# Patient Record
Sex: FEMALE | Race: WHITE | NOT HISPANIC OR LATINO | Employment: UNEMPLOYED | URBAN - NONMETROPOLITAN AREA
[De-identification: names, ages, dates, MRNs, and addresses within clinical notes are randomized per-mention and may not be internally consistent; named-entity substitution may affect disease eponyms.]

---

## 2020-04-10 ENCOUNTER — APPOINTMENT (OUTPATIENT)
Dept: ULTRASOUND IMAGING | Facility: HOSPITAL | Age: 25
End: 2020-04-10

## 2020-04-10 ENCOUNTER — HOSPITAL ENCOUNTER (OUTPATIENT)
Facility: HOSPITAL | Age: 25
Discharge: HOME | End: 2020-04-11
Attending: EMERGENCY MEDICINE | Admitting: OBSTETRICS & GYNECOLOGY

## 2020-04-10 DIAGNOSIS — O03.4 INCOMPLETE ABORTION: ICD-10-CM

## 2020-04-10 DIAGNOSIS — O03.9 MISCARRIAGE: Primary | ICD-10-CM

## 2020-04-10 LAB
ABO GROUP BLD: NORMAL
ALBUMIN SERPL-MCNC: 4.3 G/DL (ref 3.5–5.2)
ALBUMIN/GLOB SERPL: 1.5 G/DL
ALP SERPL-CCNC: 63 U/L (ref 39–117)
ALT SERPL W P-5'-P-CCNC: 16 U/L (ref 1–33)
ANION GAP SERPL CALCULATED.3IONS-SCNC: 16 MMOL/L (ref 5–15)
AST SERPL-CCNC: 12 U/L (ref 1–32)
BACTERIA UR QL AUTO: ABNORMAL /HPF
BASOPHILS # BLD AUTO: 0.04 10*3/MM3 (ref 0–0.2)
BASOPHILS NFR BLD AUTO: 0.3 % (ref 0–1.5)
BILIRUB SERPL-MCNC: <0.2 MG/DL (ref 0.2–1.2)
BILIRUB UR QL STRIP: NEGATIVE
BLD GP AB SCN SERPL QL: NEGATIVE
BUN BLD-MCNC: 10 MG/DL (ref 6–20)
BUN/CREAT SERPL: 20.8 (ref 7–25)
CALCIUM SPEC-SCNC: 8.7 MG/DL (ref 8.6–10.5)
CHLORIDE SERPL-SCNC: 101 MMOL/L (ref 98–107)
CLARITY UR: ABNORMAL
CLUE CELLS SPEC QL WET PREP: ABNORMAL
CO2 SERPL-SCNC: 22 MMOL/L (ref 22–29)
COLOR UR: ABNORMAL
CREAT BLD-MCNC: 0.48 MG/DL (ref 0.57–1)
DEPRECATED RDW RBC AUTO: 37.3 FL (ref 37–54)
EOSINOPHIL # BLD AUTO: 0.21 10*3/MM3 (ref 0–0.4)
EOSINOPHIL NFR BLD AUTO: 1.8 % (ref 0.3–6.2)
ERYTHROCYTE [DISTWIDTH] IN BLOOD BY AUTOMATED COUNT: 11.9 % (ref 12.3–15.4)
GFR SERPL CREATININE-BSD FRML MDRD: >150 ML/MIN/1.73
GFR SERPL CREATININE-BSD FRML MDRD: >150 ML/MIN/1.73
GLOBULIN UR ELPH-MCNC: 2.8 GM/DL
GLUCOSE BLD-MCNC: 130 MG/DL (ref 65–99)
GLUCOSE UR STRIP-MCNC: NEGATIVE MG/DL
HCG INTACT+B SERPL-ACNC: 6945 MIU/ML
HCT VFR BLD AUTO: 31 % (ref 34–46.6)
HGB BLD-MCNC: 10.6 G/DL (ref 12–15.9)
HGB UR QL STRIP.AUTO: ABNORMAL
HYALINE CASTS UR QL AUTO: ABNORMAL /LPF
HYDATID CYST SPEC WET PREP: ABNORMAL
IMM GRANULOCYTES # BLD AUTO: 0.05 10*3/MM3 (ref 0–0.05)
IMM GRANULOCYTES NFR BLD AUTO: 0.4 % (ref 0–0.5)
KETONES UR QL STRIP: NEGATIVE
LEUKOCYTE ESTERASE UR QL STRIP.AUTO: ABNORMAL
LYMPHOCYTES # BLD AUTO: 2.36 10*3/MM3 (ref 0.7–3.1)
LYMPHOCYTES NFR BLD AUTO: 19.8 % (ref 19.6–45.3)
MCH RBC QN AUTO: 29.4 PG (ref 26.6–33)
MCHC RBC AUTO-ENTMCNC: 34.2 G/DL (ref 31.5–35.7)
MCV RBC AUTO: 85.9 FL (ref 79–97)
MONOCYTES # BLD AUTO: 0.75 10*3/MM3 (ref 0.1–0.9)
MONOCYTES NFR BLD AUTO: 6.3 % (ref 5–12)
NEUTROPHILS # BLD AUTO: 8.53 10*3/MM3 (ref 1.7–7)
NEUTROPHILS NFR BLD AUTO: 71.4 % (ref 42.7–76)
NITRITE UR QL STRIP: NEGATIVE
NRBC BLD AUTO-RTO: 0 /100 WBC (ref 0–0.2)
NUMBER OF DOSES: NORMAL
PH UR STRIP.AUTO: <=5 [PH] (ref 5–8)
PLATELET # BLD AUTO: 361 10*3/MM3 (ref 140–450)
PMV BLD AUTO: 9.2 FL (ref 6–12)
POTASSIUM BLD-SCNC: 3.8 MMOL/L (ref 3.5–5.2)
PROT SERPL-MCNC: 7.1 G/DL (ref 6–8.5)
PROT UR QL STRIP: ABNORMAL
RBC # BLD AUTO: 3.61 10*6/MM3 (ref 3.77–5.28)
RBC # UR: ABNORMAL /HPF
REF LAB TEST METHOD: ABNORMAL
RH BLD: POSITIVE
SODIUM BLD-SCNC: 139 MMOL/L (ref 136–145)
SP GR UR STRIP: 1.01 (ref 1–1.03)
SQUAMOUS #/AREA URNS HPF: ABNORMAL /HPF
T VAGINALIS SPEC QL WET PREP: ABNORMAL
UROBILINOGEN UR QL STRIP: ABNORMAL
WBC NRBC COR # BLD: 11.94 10*3/MM3 (ref 3.4–10.8)
WBC SPEC QL WET PREP: ABNORMAL
WBC UR QL AUTO: ABNORMAL /HPF
YEAST GENITAL QL WET PREP: ABNORMAL

## 2020-04-10 PROCEDURE — 76801 OB US < 14 WKS SINGLE FETUS: CPT

## 2020-04-10 PROCEDURE — 86850 RBC ANTIBODY SCREEN: CPT | Performed by: PHYSICIAN ASSISTANT

## 2020-04-10 PROCEDURE — 80053 COMPREHEN METABOLIC PANEL: CPT | Performed by: PHYSICIAN ASSISTANT

## 2020-04-10 PROCEDURE — 86901 BLOOD TYPING SEROLOGIC RH(D): CPT | Performed by: PHYSICIAN ASSISTANT

## 2020-04-10 PROCEDURE — 76817 TRANSVAGINAL US OBSTETRIC: CPT

## 2020-04-10 PROCEDURE — 84702 CHORIONIC GONADOTROPIN TEST: CPT | Performed by: PHYSICIAN ASSISTANT

## 2020-04-10 PROCEDURE — 99284 EMERGENCY DEPT VISIT MOD MDM: CPT

## 2020-04-10 PROCEDURE — 87086 URINE CULTURE/COLONY COUNT: CPT | Performed by: PHYSICIAN ASSISTANT

## 2020-04-10 PROCEDURE — 81001 URINALYSIS AUTO W/SCOPE: CPT | Performed by: PHYSICIAN ASSISTANT

## 2020-04-10 PROCEDURE — 87210 SMEAR WET MOUNT SALINE/INK: CPT | Performed by: PHYSICIAN ASSISTANT

## 2020-04-10 PROCEDURE — 85025 COMPLETE CBC W/AUTO DIFF WBC: CPT | Performed by: PHYSICIAN ASSISTANT

## 2020-04-10 PROCEDURE — 86900 BLOOD TYPING SEROLOGIC ABO: CPT | Performed by: PHYSICIAN ASSISTANT

## 2020-04-10 RX ORDER — SODIUM CHLORIDE 0.9 % (FLUSH) 0.9 %
10 SYRINGE (ML) INJECTION AS NEEDED
Status: DISCONTINUED | OUTPATIENT
Start: 2020-04-10 | End: 2020-04-11 | Stop reason: HOSPADM

## 2020-04-10 RX ADMIN — SODIUM CHLORIDE 1000 ML: 9 INJECTION, SOLUTION INTRAVENOUS at 21:06

## 2020-04-11 ENCOUNTER — ANESTHESIA (OUTPATIENT)
Dept: PERIOP | Facility: HOSPITAL | Age: 25
End: 2020-04-11

## 2020-04-11 ENCOUNTER — ANESTHESIA EVENT (OUTPATIENT)
Dept: PERIOP | Facility: HOSPITAL | Age: 25
End: 2020-04-11

## 2020-04-11 VITALS
TEMPERATURE: 98.5 F | DIASTOLIC BLOOD PRESSURE: 71 MMHG | HEART RATE: 111 BPM | HEIGHT: 64 IN | OXYGEN SATURATION: 100 % | WEIGHT: 135 LBS | RESPIRATION RATE: 16 BRPM | SYSTOLIC BLOOD PRESSURE: 118 MMHG | BODY MASS INDEX: 23.05 KG/M2

## 2020-04-11 PROBLEM — O03.9 MISCARRIAGE: Status: ACTIVE | Noted: 2020-04-11

## 2020-04-11 PROBLEM — O03.4 INCOMPLETE ABORTION: Status: ACTIVE | Noted: 2020-04-11

## 2020-04-11 LAB
BACTERIA SPEC AEROBE CULT: NORMAL
HCT VFR BLD AUTO: 26.2 % (ref 34–46.6)
HGB BLD-MCNC: 8.9 G/DL (ref 12–15.9)

## 2020-04-11 PROCEDURE — 25010000002 PROPOFOL 10 MG/ML EMULSION: Performed by: NURSE ANESTHETIST, CERTIFIED REGISTERED

## 2020-04-11 PROCEDURE — 88305 TISSUE EXAM BY PATHOLOGIST: CPT | Performed by: OBSTETRICS & GYNECOLOGY

## 2020-04-11 PROCEDURE — G0378 HOSPITAL OBSERVATION PER HR: HCPCS

## 2020-04-11 PROCEDURE — 25010000002 CEFTRIAXONE PER 250 MG: Performed by: EMERGENCY MEDICINE

## 2020-04-11 PROCEDURE — 85014 HEMATOCRIT: CPT | Performed by: EMERGENCY MEDICINE

## 2020-04-11 PROCEDURE — 25010000002 FENTANYL CITRATE (PF) 250 MCG/5ML SOLUTION: Performed by: NURSE ANESTHETIST, CERTIFIED REGISTERED

## 2020-04-11 PROCEDURE — 59812 TREATMENT OF MISCARRIAGE: CPT | Performed by: OBSTETRICS & GYNECOLOGY

## 2020-04-11 PROCEDURE — 85018 HEMOGLOBIN: CPT | Performed by: EMERGENCY MEDICINE

## 2020-04-11 PROCEDURE — 96365 THER/PROPH/DIAG IV INF INIT: CPT

## 2020-04-11 RX ORDER — SODIUM CHLORIDE 0.9 % (FLUSH) 0.9 %
10 SYRINGE (ML) INJECTION AS NEEDED
Status: CANCELLED | OUTPATIENT
Start: 2020-04-11

## 2020-04-11 RX ORDER — SODIUM CHLORIDE, SODIUM LACTATE, POTASSIUM CHLORIDE, CALCIUM CHLORIDE 600; 310; 30; 20 MG/100ML; MG/100ML; MG/100ML; MG/100ML
100 INJECTION, SOLUTION INTRAVENOUS CONTINUOUS
Status: CANCELLED | OUTPATIENT
Start: 2020-04-11

## 2020-04-11 RX ORDER — LABETALOL HYDROCHLORIDE 5 MG/ML
5 INJECTION, SOLUTION INTRAVENOUS
Status: DISCONTINUED | OUTPATIENT
Start: 2020-04-11 | End: 2020-04-11 | Stop reason: HOSPADM

## 2020-04-11 RX ORDER — SODIUM CHLORIDE 0.9 % (FLUSH) 0.9 %
3-10 SYRINGE (ML) INJECTION AS NEEDED
Status: CANCELLED | OUTPATIENT
Start: 2020-04-11

## 2020-04-11 RX ORDER — IBUPROFEN 600 MG/1
600 TABLET ORAL EVERY 6 HOURS PRN
Status: CANCELLED | OUTPATIENT
Start: 2020-04-11

## 2020-04-11 RX ORDER — FENTANYL CITRATE 50 UG/ML
INJECTION, SOLUTION INTRAMUSCULAR; INTRAVENOUS AS NEEDED
Status: DISCONTINUED | OUTPATIENT
Start: 2020-04-11 | End: 2020-04-11 | Stop reason: SURG

## 2020-04-11 RX ORDER — FLUMAZENIL 0.1 MG/ML
0.2 INJECTION INTRAVENOUS AS NEEDED
Status: DISCONTINUED | OUTPATIENT
Start: 2020-04-11 | End: 2020-04-11 | Stop reason: HOSPADM

## 2020-04-11 RX ORDER — LIDOCAINE HYDROCHLORIDE 10 MG/ML
0.5 INJECTION, SOLUTION EPIDURAL; INFILTRATION; INTRACAUDAL; PERINEURAL ONCE AS NEEDED
Status: CANCELLED | OUTPATIENT
Start: 2020-04-11

## 2020-04-11 RX ORDER — IBUPROFEN 600 MG/1
600 TABLET ORAL ONCE AS NEEDED
Status: DISCONTINUED | OUTPATIENT
Start: 2020-04-11 | End: 2020-04-11 | Stop reason: HOSPADM

## 2020-04-11 RX ORDER — NALOXONE HCL 0.4 MG/ML
0.4 VIAL (ML) INJECTION AS NEEDED
Status: DISCONTINUED | OUTPATIENT
Start: 2020-04-11 | End: 2020-04-11 | Stop reason: HOSPADM

## 2020-04-11 RX ORDER — OXYCODONE AND ACETAMINOPHEN 10; 325 MG/1; MG/1
1 TABLET ORAL ONCE AS NEEDED
Status: DISCONTINUED | OUTPATIENT
Start: 2020-04-11 | End: 2020-04-11 | Stop reason: HOSPADM

## 2020-04-11 RX ORDER — FENTANYL CITRATE 50 UG/ML
25 INJECTION, SOLUTION INTRAMUSCULAR; INTRAVENOUS
Status: DISCONTINUED | OUTPATIENT
Start: 2020-04-11 | End: 2020-04-11 | Stop reason: HOSPADM

## 2020-04-11 RX ORDER — SODIUM CHLORIDE 0.9 % (FLUSH) 0.9 %
3 SYRINGE (ML) INJECTION EVERY 12 HOURS SCHEDULED
Status: CANCELLED | OUTPATIENT
Start: 2020-04-11

## 2020-04-11 RX ORDER — OXYCODONE HYDROCHLORIDE AND ACETAMINOPHEN 5; 325 MG/1; MG/1
1-2 TABLET ORAL EVERY 4 HOURS PRN
Qty: 6 TABLET | Refills: 0 | Status: SHIPPED | OUTPATIENT
Start: 2020-04-11

## 2020-04-11 RX ORDER — KETAMINE HYDROCHLORIDE 50 MG/ML
INJECTION, SOLUTION, CONCENTRATE INTRAMUSCULAR; INTRAVENOUS AS NEEDED
Status: DISCONTINUED | OUTPATIENT
Start: 2020-04-11 | End: 2020-04-11 | Stop reason: SURG

## 2020-04-11 RX ORDER — PROMETHAZINE HYDROCHLORIDE 25 MG/1
12.5 TABLET ORAL ONCE AS NEEDED
Status: DISCONTINUED | OUTPATIENT
Start: 2020-04-11 | End: 2020-04-11 | Stop reason: HOSPADM

## 2020-04-11 RX ORDER — OXYCODONE AND ACETAMINOPHEN 7.5; 325 MG/1; MG/1
2 TABLET ORAL EVERY 4 HOURS PRN
Status: DISCONTINUED | OUTPATIENT
Start: 2020-04-11 | End: 2020-04-11 | Stop reason: HOSPADM

## 2020-04-11 RX ORDER — SODIUM CHLORIDE, SODIUM LACTATE, POTASSIUM CHLORIDE, CALCIUM CHLORIDE 600; 310; 30; 20 MG/100ML; MG/100ML; MG/100ML; MG/100ML
INJECTION, SOLUTION INTRAVENOUS CONTINUOUS PRN
Status: DISCONTINUED | OUTPATIENT
Start: 2020-04-11 | End: 2020-04-11 | Stop reason: SURG

## 2020-04-11 RX ORDER — MIDAZOLAM HYDROCHLORIDE 1 MG/ML
1 INJECTION INTRAMUSCULAR; INTRAVENOUS
Status: CANCELLED | OUTPATIENT
Start: 2020-04-11

## 2020-04-11 RX ORDER — MIDAZOLAM HYDROCHLORIDE 1 MG/ML
2 INJECTION INTRAMUSCULAR; INTRAVENOUS
Status: CANCELLED | OUTPATIENT
Start: 2020-04-11

## 2020-04-11 RX ORDER — ONDANSETRON 2 MG/ML
4 INJECTION INTRAMUSCULAR; INTRAVENOUS ONCE AS NEEDED
Status: DISCONTINUED | OUTPATIENT
Start: 2020-04-11 | End: 2020-04-11 | Stop reason: HOSPADM

## 2020-04-11 RX ORDER — ACETAMINOPHEN 500 MG
1000 TABLET ORAL ONCE
Status: CANCELLED | OUTPATIENT
Start: 2020-04-11 | End: 2020-04-11

## 2020-04-11 RX ORDER — OXYCODONE HYDROCHLORIDE AND ACETAMINOPHEN 5; 325 MG/1; MG/1
1 TABLET ORAL ONCE AS NEEDED
Status: CANCELLED | OUTPATIENT
Start: 2020-04-11 | End: 2020-04-21

## 2020-04-11 RX ADMIN — KETAMINE HYDROCHLORIDE 100 MG: 50 INJECTION, SOLUTION INTRAMUSCULAR; INTRAVENOUS at 04:56

## 2020-04-11 RX ADMIN — SODIUM CHLORIDE, POTASSIUM CHLORIDE, SODIUM LACTATE AND CALCIUM CHLORIDE: 600; 310; 30; 20 INJECTION, SOLUTION INTRAVENOUS at 04:47

## 2020-04-11 RX ADMIN — PROPOFOL 125 MCG/KG/MIN: 10 INJECTION, EMULSION INTRAVENOUS at 04:56

## 2020-04-11 RX ADMIN — FENTANYL CITRATE 100 MCG: 50 INJECTION INTRAMUSCULAR; INTRAVENOUS at 04:56

## 2020-04-11 RX ADMIN — SODIUM CHLORIDE 1000 ML: 9 INJECTION, SOLUTION INTRAVENOUS at 03:47

## 2020-04-11 RX ADMIN — CEFTRIAXONE SODIUM 2 G: 2 INJECTION, POWDER, FOR SOLUTION INTRAMUSCULAR; INTRAVENOUS at 00:14

## 2020-04-11 RX ADMIN — DOXYCYCLINE 100 MG: 100 INJECTION, POWDER, LYOPHILIZED, FOR SOLUTION INTRAVENOUS at 05:10

## 2020-04-11 NOTE — ED PROVIDER NOTES
"Subjective   History of Present Illness    Patient is a 24-year-old female presenting to ED with vaginal bleeding and pregnancy.  Patient is G5, P4, A1 (miscarriage) with one set of twins.  Patient reported her LNMP was on 1/26/2020 making her approximately 10-1/2 weeks pregnant at this time.  Patient reported 10 days ago she developed very light vaginal spotting which turned into light vaginal bleeding.  Patient reported this afternoon she started having intense lower abdominal cramping at which time she began \"gushing blood and passing lots and lots of clots totaling a quart of blood.\"  Patient reported she started to feel very dizzy and lightheaded and felt like her color was not normal at which time her family contacted a  to bring her to the ED as she is part of the [facility] community.  Patient reported upon arrival to ED her abdomen pain is improving and she denies any nausea, diaphoresis, or any other symptoms.  Patient stated she wanted to make sure that all of the miscarriage had passed at which time she decided to present to the ED. Patient denies any recent illnesses, fevers, hematuria, dysuria, or flank pain. Patient denies any medication use prior to arrival.     Patient denies use of any tobacco products including cigarettes.  Patient denies use of alcohol, marijuana, or any other IV/recreational/illicit drugs.  Patient reported she takes daily vitamins with no other medications.  Patient denies any previous surgical history.  Patient denies any known medication allergies.    Records reviewed show patient has no previous ED/outptient visits or imaging.     Review of Systems   Constitutional: Negative for chills, diaphoresis and fever.   HENT: Negative.    Eyes: Negative.    Respiratory: Negative.  Negative for cough and shortness of breath.    Cardiovascular: Negative.  Negative for chest pain and palpitations.   Gastrointestinal: Positive for abdominal pain (lower abdominal cramping). Negative for " nausea and vomiting.   Genitourinary: Positive for vaginal bleeding (spotting for 10 days; passing clots/heavy today). Negative for dysuria, flank pain, hematuria and vaginal discharge.        Reports + pregnancy (approx 10 weeks tomorrow)   Musculoskeletal: Negative.  Negative for myalgias.   Skin: Positive for pallor. Negative for rash and wound.   Neurological: Positive for dizziness (improving), weakness (generalized) and light-headedness. Negative for syncope.   Psychiatric/Behavioral: Negative.  Negative for confusion.   All other systems reviewed and are negative.      History reviewed. No pertinent past medical history.    No Known Allergies    History reviewed. No pertinent surgical history.    History reviewed. No pertinent family history.    Social History     Socioeconomic History   • Marital status:      Spouse name: Not on file   • Number of children: Not on file   • Years of education: Not on file   • Highest education level: Not on file   Tobacco Use   • Smoking status: Never Smoker   Substance and Sexual Activity   • Alcohol use: Never     Frequency: Never   • Drug use: Defer   • Sexual activity: Defer           Objective   Physical Exam   Constitutional: She is oriented to person, place, and time. She appears well-developed and well-nourished. She is cooperative. No distress.   HENT:   Head: Normocephalic and atraumatic.   Right Ear: External ear normal.   Left Ear: External ear normal.   Mouth/Throat: Uvula is midline, oropharynx is clear and moist and mucous membranes are normal.   Eyes: Pupils are equal, round, and reactive to light. Conjunctivae, EOM and lids are normal. Right eye exhibits no discharge. Left eye exhibits no discharge. Right conjunctiva is not injected. Left conjunctiva is not injected. No scleral icterus.   Neck: Normal range of motion. Neck supple.   Cardiovascular: Regular rhythm, normal heart sounds, intact distal pulses and normal pulses. Tachycardia present.   No  murmur heard.  Pulses:       Radial pulses are 2+ on the right side, and 2+ on the left side.        Posterior tibial pulses are 2+ on the right side, and 2+ on the left side.   Pulmonary/Chest: Effort normal and breath sounds normal. No respiratory distress. She has no decreased breath sounds. She has no wheezes. She has no rhonchi. She has no rales. She exhibits no bony tenderness.   Abdominal: Soft. Normal appearance and bowel sounds are normal. There is tenderness (mild) in the suprapubic area. There is no guarding and no CVA tenderness.   Genitourinary: Pelvic exam was performed with patient supine. There is bleeding in the vagina.   Genitourinary Comments: Chaperone present for entire examination, Tonya RN  1 large baseball sized clot as well as 1 tangerine sized clot which appeared to be tissue material nature passed just prior to performing pelvic examination. Cervical OS open.      Musculoskeletal:        Cervical back: Normal.        Thoracic back: Normal.        Lumbar back: Normal.   Lymphadenopathy:     She has no cervical adenopathy.   Neurological: She is alert and oriented to person, place, and time. She has normal strength.   Skin: Skin is warm, dry and intact. Capillary refill takes less than 2 seconds. No rash noted. She is not diaphoretic. There is pallor.   Psychiatric: She has a normal mood and affect. Her speech is normal and behavior is normal. Thought content normal.   Nursing note and vitals reviewed.      Procedures           ED Course  ED Course as of Apr 11 0340   Fri Apr 10, 2020   2138 Impression discussion with .  Updated  on plan and pending results at this time.   with no further questions, concerns, or needs at this time.    [JS]   2140 Urinalysis with TNTC RBC, 6-12 white blood cells, no bacteria, 0-12 squamous epithelium, small leukocytes, nitrate negative.  CMP with evidence of dehydration, anion gap of 16, decreased creatinine to 0.48.  Leukocytosis of  11.94,  hemoglobin and hematocrit 10.6/31.  Remainder of labs pending at this time.Ultrasound pending transportation to department.    [JS]   2151 Blood type A POSITIVE.  HCG quant level 6945    [JS]   2213 Patient just returning from US at this time.  Patient still tachycardic at 123. Patient reporting she had no increased pain after ultrasound.  Reviewed with patient urinalysis findings, hCG quant level, and continued need for pelvic examination.  Patient is amenable to continued treatment plan at this time with no further questions, concerns, or needs.    [JS]   2230 Pelvic examination performed at bedside at this time.  Chaperone present for entire examination, Jennifer CALVILLO.  Just prior to examination patient felt the urge to urinate at which time she passed a very large baseball size clot as well as a smaller tangerine sized clot.  These clots appear to be tissue material in nature possibly placenta.  After this pelvic examination was performed and a few very small clots were removed.  Also appears to be open at this time.  Patient had tenderness upon examination but no other abnormalities noted.  Patient tachycardic at 141 after examination.  Patient very adamantly continues to deny need for anti-emetic or pain medications.  Patient advised that ultrasound results as well as wet prep are pending still at this time.  Patient was no further questions, concerns, or needs.    [JS]   2236 Discussed case with Dr. Jairon Fortune.  Reviewed patient's history as well as preliminary ultrasound report.  Discussed clots passed during pelvic examination.  Dr. Fortune in agreement at this time for OB consultation.    [JS]   2241 Phone discussion with Dr. Levy, OB/GYN.  Advises at this time that patient's OS appear to be dilated due to her history of multi-gestations.  Recommends repeating ultrasound and contacting her back with results. Dr. Levy made aware that Dr. Fortune will be assuming care of patient at this  time.     [JS]   2251 Upon reevaluation at this time patient resting in bed slightly nervous still.  Discussed with patient results of initial ultrasound as well as discussion with OB/GYN provider.  Discussed with patient need to repeat ultrasound at this time to ensure all products of conception have been passed.  Patient is very appreciative of help and amenable to continued treatment plan at this time.  Patient has been in made aware that my shift is ending and there will be a change in provider to Dr. Fortune.  Patient with no further questions, concerns, or needs at this time and continues to deny any needs for any type of medication intervention.    [JS]   2255 Dr. Jairon Fortune will be assuming care of patient at this time.  Dr. Whitley made aware that pending the results of repeat ultrasound as well as repeat H&H patient will be reevaluated with a call placed to Dr. Levy for further disposition decision.  Please see Dr. Whitley's note below for further ED course as well as final diagnosis.Working diagnosis at this time is: Miscarriage.    [JS]   Sat Apr 11, 2020   0224 HR is still 120s, she has dropped her hgb. Given this I did speak with Dr. Levy, will come to see for possible D and C    [JH]   0340 DR. Levy will take for D and C    [JH]      ED Course User Index  [JH] Jairon Fortune MD  [JS] Jaiden Louise PA-C EMR Dragon/transcription disclaimer: Much of this encounter note was created utilizing an electronic transcription/translation of spoken language to printed text.  Electronic translation of spoken language may permit erroneous, or at times, nonsensical words or phrases to be in advertently transcribed; although I have reviewed the note for such errors to the best of my ability, some may still exist.            MDM  Number of Diagnoses or Management Options     Amount and/or Complexity of Data Reviewed  Clinical lab tests: ordered and  reviewed  Tests in the radiology section of CPT®: ordered  Decide to obtain previous medical records or to obtain history from someone other than the patient: yes  Review and summarize past medical records: yes  Discuss the patient with other providers: yes        Final diagnoses:   Miscarriage            Jairon Fortune MD  04/11/20 0349

## 2020-04-11 NOTE — ANESTHESIA POSTPROCEDURE EVALUATION
"Patient: Jessica Bang    Procedure Summary     Date:  04/11/20 Room / Location:   [facility] OR 06 /  [facility]    Anesthesia Start:  0453 Anesthesia Stop:  0528    Procedure:  DILATATION AND CURETTAGE WITH SUCTION (N/A Vagina) Diagnosis:       Miscarriage      (Miscarriage [O03.9])    Surgeon:  Julissa Levy DO Provider:  Arnulfo Beasley CRNA    Anesthesia Type:  general ASA Status:  1 - Emergent          Anesthesia Type: general    Vitals  No vitals data found for the desired time range.          Post Anesthesia Care and Evaluation    Patient location during evaluation: PACU  Patient participation: complete - patient participated  Level of consciousness: awake and alert  Pain management: adequate  Airway patency: patent  Anesthetic complications: No anesthetic complications    Cardiovascular status: acceptable  Respiratory status: acceptable  Hydration status: acceptable    Comments: Blood pressure 115/68, pulse 96, temperature 98.5 °F (36.9 °C), temperature source Oral, resp. rate 16, height 162.6 cm (64\"), weight 61.2 kg (135 lb), last menstrual period 01/26/2020, SpO2 100 %.    Pt discharged from PACU based on stephen score >8      "

## 2020-04-11 NOTE — OP NOTE
[facility]  Jessica Bang  : 1995  MRN: [id number]  [facility]: [id number]  Date: 2020    Operative Note    DILATATION AND CURETTAGE WITH SUCTION      Pre-op Diagnosis:  Miscarriage [O03.9]   Incomplete    Post-op Diagnosis:  Post-Op Diagnosis Codes:     * Miscarriage [O03.9]   Procedure: Procedure(s):  DILATATION AND CURETTAGE WITH SUCTION   Surgeon: Surgeon(s):  Julissa Levy DO       Anesthesia: General by ERIC Beasley CRNA     Estimated Blood Loss: minimal   mls   Fluids: 1100   mls   UOP: 50   mls   Drains: none   ABx: Doxycycline      Specimens:  Products of conception   Findings: 7 to 8 week size uterus, cervix noted to be dilated 1 cm   Complications: None      INDICATION: Jessica Bang is a 24 y.o. female  Who presented to the emergency room with complaints of heavy vaginal bleeding. On examination patient was noted to be dilated 1 cm as well as her hemoglobin dropped wihile being evaluated in the emergency department. Risk, benefits to suction dilatation and curettage were discussed with patient.   PROCEDURE IN DETAIL: After informed consent was obtained, the patient was taken to the operating room, where general anesthesia was administered. She was placed in the lithotomy position in [facility], and her abdomen perineum and vagina were prepped and draped in normal sterile fashion. A weighted speculum was placed in the vagina and her bladder drained with a straight metal catheter. The anterior lip of the cervix was visualized and grasped with a single tooth tenaculum forcep. The cervix was dilated with Scott dilators to accept a 8 mm curved suction curet.  The curet was gently placed in the uterus at the fundus and the tubing attached.  The suction device was activated and suction allowed to build.  The suction curette was then rotated and slowly withdrawn with tissue noted through the tubing.  The suction was released and the curette reintroduced to the uterine fundus.  The curet  was then rotated and withdrawn again noting further removal of tissue.  A third pass in a similar fashion revealed no further tissue.  A medium size sharp curette was then placed in the uterus and a gritty texture was noted in all 4 quadrants.  The sharp curette was removed and the suction curette passed one more time to remove any clots or debris from the uterine cavity.  The cervix was then observed and no active bleeding was noted.  The single tooth tenaculum was removed and the cervix was hemostatic.  All instruments were then removed from the vagina.   The patient was then awakened and taken to the recovery room in stable condition.  She tolerated the procedure well and without complications.  All sponge needle and instrument counts were correct times 3 per the OR staff.    Julissa Levy DO   4/11/2020  05:31

## 2020-04-11 NOTE — H&P
"[facility]  Jessica Bang  : 1995  MRN: [id number]  CSN: [id number]    History and Physical    Subjective   Jessica Bang is a 24 y.o. year old  LMP 2020 presents with complaints of vaginal bleeding. Patient reports that she has had bleeding for the last 10 days. She reports that earlier today it became heavier and she started to feel lightheaded and dizzy. She reports that she had not yet had an ultrasound to confirm dating but had been seen by a midwife approximately two weeks ago.     History reviewed. No pertinent past medical history.  History reviewed. No pertinent surgical history.  Social History    Tobacco Use      Smoking status: Never Smoker      Current Facility-Administered Medications:   •  sodium chloride 0.9 % bolus 1,000 mL, 1,000 mL, Intravenous, Once, Jairon Fortune MD  •  [COMPLETED] Insert peripheral IV, , , Once **AND** sodium chloride 0.9 % flush 10 mL, 10 mL, Intravenous, PRN, Jaiden Louise PA-C  No current outpatient medications on file.    No Known Allergies    History reviewed. No pertinent family history.  Review of Systems   Genitourinary: Positive for pelvic pain and vaginal bleeding.         Objective   /81   Pulse 112   Temp 98.5 °F (36.9 °C) (Oral)   Resp 16   Ht 162.6 cm (64\")   Wt 61.2 kg (135 lb)   LMP 2020 (Approximate)   SpO2 97%   BMI 23.17 kg/m²     Physical Exam   Physical Exam   Constitutional: She appears well-developed and well-nourished.   HENT:   Head: Normocephalic and atraumatic.   Eyes: Conjunctivae are normal. Right eye exhibits no discharge. Left eye exhibits no discharge.   Neck: Normal range of motion.   Pulmonary/Chest: Effort normal.   Abdominal: Soft. She exhibits no distension and no mass. There is no tenderness. There is no rebound and no guarding.   Genitourinary: Vagina normal. Pelvic exam was performed with patient supine. There is no rash or tenderness on the right labia. There is no rash or tenderness on " the left labia.   Genitourinary Comments: Scant blood noted, large clots noted in bed pan that patient passed previously, cervix noted to be 1 cm dilated   Musculoskeletal: Normal range of motion.   Neurological: She is alert.   Skin: Skin is warm and dry.   Psychiatric: She has a normal mood and affect. Her behavior is normal. Judgment normal.   Nursing note and vitals reviewed.      Labs  Lab Results   Component Value Date     04/10/2020    HGB 8.9 (L) 2020    HCT 26.2 (L) 2020    WBC 11.94 (H) 04/10/2020     04/10/2020    K 3.8 04/10/2020     04/10/2020    CO2 22.0 04/10/2020    BUN 10 04/10/2020    CREATININE 0.48 (L) 04/10/2020    GLUCOSE 130 (H) 04/10/2020    ALBUMIN 4.30 04/10/2020    CALCIUM 8.7 04/10/2020    AST 12 04/10/2020    ALT 16 04/10/2020    BILITOT <0.2 (L) 04/10/2020     TVUS today revealed irregular appearing gestational sac with no fetal pole or yolk sac     Assessment & Plan    Incomplete    1. Discussed recommendations of dilatation and curettage with suction at this time due to incomplete . Discussed with patient that since she has already had significant bleeding in the ER and her cervix is noted to be dilated would recommend surgery at this time. Risk, benefits discussed with patient and support person. Discussed risk including bleeding and infection as well as damage to bowel and bladder   2. Patient agreeable to suction d and c at this time. Will admit to OR.     Julissa Levy DO  2020  03:33

## 2020-04-11 NOTE — ANESTHESIA PREPROCEDURE EVALUATION
Anesthesia Evaluation     Patient summary reviewed and Nursing notes reviewed   NPO Solid Status: > 8 hours  NPO Liquid Status: > 4 hours           Airway   Mallampati: I  No difficulty expected  Dental - normal exam     Pulmonary - negative pulmonary ROS   Cardiovascular - negative cardio ROS  Exercise tolerance: excellent (>7 METS)        Neuro/Psych- negative ROS  GI/Hepatic/Renal/Endo - negative ROS     Musculoskeletal (-) negative ROS    Abdominal    Substance History - negative use     OB/GYN    (+) Pregnant,         Other                        Anesthesia Plan    ASA 1 - emergent     general     intravenous induction     Anesthetic plan, all risks, benefits, and alternatives have been provided, discussed and informed consent has been obtained with: patient.

## 2020-04-14 ENCOUNTER — TELEPHONE (OUTPATIENT)
Dept: OBSTETRICS AND GYNECOLOGY | Facility: CLINIC | Age: 25
End: 2020-04-14

## 2020-04-14 LAB
CYTO UR: NORMAL
LAB AP CASE REPORT: NORMAL
PATH REPORT.FINAL DX SPEC: NORMAL
PATH REPORT.GROSS SPEC: NORMAL

## 2020-04-14 NOTE — TELEPHONE ENCOUNTER
Scheduled patient for an upcoming postop on 05/01/2020. Patient is Confucianism and states she doesn't have a phone number so appointment date and time was mailed certified on 04/14/2020.

## 2020-05-01 ENCOUNTER — OFFICE VISIT (OUTPATIENT)
Dept: OBSTETRICS AND GYNECOLOGY | Facility: CLINIC | Age: 25
End: 2020-05-01

## 2020-05-01 VITALS
HEIGHT: 64 IN | SYSTOLIC BLOOD PRESSURE: 120 MMHG | WEIGHT: 146 LBS | DIASTOLIC BLOOD PRESSURE: 68 MMHG | BODY MASS INDEX: 24.92 KG/M2

## 2020-05-01 DIAGNOSIS — Z98.890 POSTOPERATIVE STATE: Primary | ICD-10-CM

## 2020-05-01 PROCEDURE — 99024 POSTOP FOLLOW-UP VISIT: CPT | Performed by: OBSTETRICS & GYNECOLOGY

## 2020-05-01 NOTE — PROGRESS NOTES
"Subjective   Jessica Bang is a 24 y.o. female.     Chief Complaint   Patient presents with   • Post-op     Pt is here for a post op follow up Pt had a D&C on 4/10  PT is doing well after the procedure.  PT states she did have some headaches at first but is much better now.  Pt states she is not bleeding for the past week       24 year old female para 4 presents for postoperative evaluation from a suction dilatation and curettage for incomplete . Patient reports she is doing well at this time. Pain controlled. She is no longer bleeding at this time. She is urinating and ambulating without difficulty. NO other complaints at this time.        Review of Systems   Constitutional: Negative for chills and fever.   Genitourinary: Positive for vaginal bleeding. Negative for vaginal discharge.       Objective   /68   Ht 162.6 cm (64\")   Wt 66.2 kg (146 lb)   LMP 2020 (Approximate)   Breastfeeding No   BMI 25.06 kg/m²   Patient's last menstrual period was 2020 (approximate).  Physical Exam   Constitutional: She appears well-developed and well-nourished. No distress.   HENT:   Head: Normocephalic and atraumatic.   Pulmonary/Chest: Effort normal.   Abdominal: Soft. There is no tenderness.   Genitourinary: Vagina normal and cervix normal. Pelvic exam was performed with patient supine. There is no tenderness or lesion on the right labia. There is no tenderness or lesion on the left labia. Uterus is not enlarged or tender. Cervix does not exhibit motion tenderness, discharge or friability. Right adnexum displays no tenderness and no fullness. Left adnexum displays no tenderness and no fullness. No tenderness or bleeding in the vagina. No signs of injury around the vagina. No vaginal discharge found.   Nursing note and vitals reviewed.        Assessment/Plan   Problems Addressed this Visit     None      Visit Diagnoses     Postoperative state    -  Primary    Relevant Orders    TSH    CBC & " Differential      -Will check CBC at this time.   -Counseled patient at this time about postoperative restriction   -Will check TSH due to patient's history maternal history of thyroid dysfunction.   -Counseled patient about returning if she has another miscarriage for further work up at this time.   -All questions answered and patient verbalized understanding and had no further questions.   -Encourage prenatal vitamin at this time        Julissa Levy, DO

## 2020-05-02 LAB
BASOPHILS # BLD AUTO: 0.03 10*3/MM3 (ref 0–0.2)
BASOPHILS NFR BLD AUTO: 0.4 % (ref 0–1.5)
EOSINOPHIL # BLD AUTO: 0.27 10*3/MM3 (ref 0–0.4)
EOSINOPHIL NFR BLD AUTO: 3.9 % (ref 0.3–6.2)
ERYTHROCYTE [DISTWIDTH] IN BLOOD BY AUTOMATED COUNT: 12.9 % (ref 12.3–15.4)
HCT VFR BLD AUTO: 32.3 % (ref 34–46.6)
HGB BLD-MCNC: 10.3 G/DL (ref 12–15.9)
IMM GRANULOCYTES # BLD AUTO: 0.02 10*3/MM3 (ref 0–0.05)
IMM GRANULOCYTES NFR BLD AUTO: 0.3 % (ref 0–0.5)
LYMPHOCYTES # BLD AUTO: 2.52 10*3/MM3 (ref 0.7–3.1)
LYMPHOCYTES NFR BLD AUTO: 36.5 % (ref 19.6–45.3)
MCH RBC QN AUTO: 28.4 PG (ref 26.6–33)
MCHC RBC AUTO-ENTMCNC: 31.9 G/DL (ref 31.5–35.7)
MCV RBC AUTO: 89 FL (ref 79–97)
MONOCYTES # BLD AUTO: 0.34 10*3/MM3 (ref 0.1–0.9)
MONOCYTES NFR BLD AUTO: 4.9 % (ref 5–12)
NEUTROPHILS # BLD AUTO: 3.73 10*3/MM3 (ref 1.7–7)
NEUTROPHILS NFR BLD AUTO: 54 % (ref 42.7–76)
NRBC BLD AUTO-RTO: 0 /100 WBC (ref 0–0.2)
PLATELET # BLD AUTO: 383 10*3/MM3 (ref 140–450)
RBC # BLD AUTO: 3.63 10*6/MM3 (ref 3.77–5.28)
TSH SERPL DL<=0.005 MIU/L-ACNC: 2.62 UIU/ML (ref 0.27–4.2)
WBC # BLD AUTO: 6.91 10*3/MM3 (ref 3.4–10.8)

## (undated) DEVICE — PAD D&C: Brand: MEDLINE INDUSTRIES, INC.

## (undated) DEVICE — CANSTR SXN UTER NO FLTR SURG

## (undated) DEVICE — TRAP TISS

## (undated) DEVICE — GLV SURG SENSICARE W/ALOE PF LF SZ6 STRL

## (undated) DEVICE — PK TURNOVER RM ADV

## (undated) DEVICE — U.V.A.C. SWIVEL HANDLE W/TUBING, 6': Brand: CONVERTORS

## (undated) DEVICE — CURETTE CANN RIGID CRV SHT 8MM STRL

## (undated) DEVICE — TBG W FLTR FOR BERKELEY SYSTEM